# Patient Record
Sex: FEMALE | Race: WHITE | NOT HISPANIC OR LATINO | Employment: FULL TIME
[De-identification: names, ages, dates, MRNs, and addresses within clinical notes are randomized per-mention and may not be internally consistent; named-entity substitution may affect disease eponyms.]

---

## 2017-06-10 ENCOUNTER — HEALTH MAINTENANCE LETTER (OUTPATIENT)
Age: 27
End: 2017-06-10

## 2019-09-30 ENCOUNTER — HEALTH MAINTENANCE LETTER (OUTPATIENT)
Age: 29
End: 2019-09-30

## 2021-01-15 ENCOUNTER — HEALTH MAINTENANCE LETTER (OUTPATIENT)
Age: 31
End: 2021-01-15

## 2021-10-24 ENCOUNTER — HEALTH MAINTENANCE LETTER (OUTPATIENT)
Age: 31
End: 2021-10-24

## 2022-02-13 ENCOUNTER — HEALTH MAINTENANCE LETTER (OUTPATIENT)
Age: 32
End: 2022-02-13

## 2022-10-15 ENCOUNTER — HEALTH MAINTENANCE LETTER (OUTPATIENT)
Age: 32
End: 2022-10-15

## 2023-03-11 ENCOUNTER — HOSPITAL ENCOUNTER (EMERGENCY)
Facility: CLINIC | Age: 33
Discharge: HOME | End: 2023-03-11
Attending: EMERGENCY MEDICINE | Admitting: EMERGENCY MEDICINE

## 2023-03-11 ENCOUNTER — APPOINTMENT (OUTPATIENT)
Dept: ULTRASOUND IMAGING | Facility: CLINIC | Age: 33
End: 2023-03-11
Attending: EMERGENCY MEDICINE

## 2023-03-11 VITALS
TEMPERATURE: 97.5 F | HEART RATE: 99 BPM | OXYGEN SATURATION: 99 % | DIASTOLIC BLOOD PRESSURE: 88 MMHG | RESPIRATION RATE: 20 BRPM | HEIGHT: 67 IN | WEIGHT: 200 LBS | SYSTOLIC BLOOD PRESSURE: 133 MMHG | BODY MASS INDEX: 31.39 KG/M2

## 2023-03-11 DIAGNOSIS — O20.0 THREATENED ABORTION: ICD-10-CM

## 2023-03-11 LAB
ABO/RH(D): NORMAL
ABO/RH(D): NORMAL
ANION GAP SERPL CALCULATED.3IONS-SCNC: 8 MMOL/L (ref 7–15)
ANTIBODY SCREEN: NEGATIVE
BASOPHILS # BLD AUTO: 0 10E3/UL (ref 0–0.2)
BASOPHILS NFR BLD AUTO: 0 %
BUN SERPL-MCNC: 10.2 MG/DL (ref 6–20)
CALCIUM SERPL-MCNC: 9.3 MG/DL (ref 8.6–10)
CHLORIDE SERPL-SCNC: 102 MMOL/L (ref 98–107)
CREAT SERPL-MCNC: 0.71 MG/DL (ref 0.51–0.95)
DEPRECATED HCO3 PLAS-SCNC: 25 MMOL/L (ref 22–29)
EOSINOPHIL # BLD AUTO: 0.1 10E3/UL (ref 0–0.7)
EOSINOPHIL NFR BLD AUTO: 1 %
ERYTHROCYTE [DISTWIDTH] IN BLOOD BY AUTOMATED COUNT: 11.8 % (ref 10–15)
FETAL BLEED SCREEN: NORMAL
GFR SERPL CREATININE-BSD FRML MDRD: >90 ML/MIN/1.73M2
GLUCOSE SERPL-MCNC: 113 MG/DL (ref 70–99)
HCG INTACT+B SERPL-ACNC: ABNORMAL MIU/ML
HCT VFR BLD AUTO: 38.7 % (ref 35–47)
HGB BLD-MCNC: 13.5 G/DL (ref 11.7–15.7)
IMM GRANULOCYTES # BLD: 0 10E3/UL
IMM GRANULOCYTES NFR BLD: 0 %
LYMPHOCYTES # BLD AUTO: 2 10E3/UL (ref 0.8–5.3)
LYMPHOCYTES NFR BLD AUTO: 22 %
MCH RBC QN AUTO: 31.8 PG (ref 26.5–33)
MCHC RBC AUTO-ENTMCNC: 34.9 G/DL (ref 31.5–36.5)
MCV RBC AUTO: 91 FL (ref 78–100)
MONOCYTES # BLD AUTO: 0.7 10E3/UL (ref 0–1.3)
MONOCYTES NFR BLD AUTO: 8 %
NEUTROPHILS # BLD AUTO: 6.3 10E3/UL (ref 1.6–8.3)
NEUTROPHILS NFR BLD AUTO: 69 %
NRBC # BLD AUTO: 0 10E3/UL
NRBC BLD AUTO-RTO: 0 /100
PLATELET # BLD AUTO: 242 10E3/UL (ref 150–450)
POTASSIUM SERPL-SCNC: 3.7 MMOL/L (ref 3.4–5.3)
RBC # BLD AUTO: 4.24 10E6/UL (ref 3.8–5.2)
SODIUM SERPL-SCNC: 135 MMOL/L (ref 136–145)
SPECIMEN EXPIRATION DATE: NORMAL
SPECIMEN EXPIRATION DATE: NORMAL
WBC # BLD AUTO: 9.2 10E3/UL (ref 4–11)

## 2023-03-11 PROCEDURE — 250N000011 HC RX IP 250 OP 636: Performed by: EMERGENCY MEDICINE

## 2023-03-11 PROCEDURE — 85461 HEMOGLOBIN FETAL: CPT | Performed by: EMERGENCY MEDICINE

## 2023-03-11 PROCEDURE — 76801 OB US < 14 WKS SINGLE FETUS: CPT

## 2023-03-11 PROCEDURE — 80048 BASIC METABOLIC PNL TOTAL CA: CPT | Performed by: EMERGENCY MEDICINE

## 2023-03-11 PROCEDURE — 86901 BLOOD TYPING SEROLOGIC RH(D): CPT | Performed by: EMERGENCY MEDICINE

## 2023-03-11 PROCEDURE — 84702 CHORIONIC GONADOTROPIN TEST: CPT | Performed by: EMERGENCY MEDICINE

## 2023-03-11 PROCEDURE — 36415 COLL VENOUS BLD VENIPUNCTURE: CPT | Performed by: EMERGENCY MEDICINE

## 2023-03-11 PROCEDURE — 85025 COMPLETE CBC W/AUTO DIFF WBC: CPT | Performed by: EMERGENCY MEDICINE

## 2023-03-11 PROCEDURE — 99285 EMERGENCY DEPT VISIT HI MDM: CPT | Mod: 25

## 2023-03-11 PROCEDURE — 86850 RBC ANTIBODY SCREEN: CPT | Performed by: EMERGENCY MEDICINE

## 2023-03-11 RX ADMIN — HUMAN RHO(D) IMMUNE GLOBULIN 300 MCG: 1500 SOLUTION INTRAMUSCULAR; INTRAVENOUS at 15:33

## 2023-03-11 ASSESSMENT — ACTIVITIES OF DAILY LIVING (ADL)
ADLS_ACUITY_SCORE: 35
ADLS_ACUITY_SCORE: 33

## 2023-03-11 ASSESSMENT — ENCOUNTER SYMPTOMS: ABDOMINAL PAIN: 1

## 2023-03-11 NOTE — ED PROVIDER NOTES
"  History     Chief Complaint:  Vaginal Bleeding (2nd pregnancy, bleeding started a week ago, cramping as well. Approx 8 weeks. )       The history is provided by the patient.      Randee Townsend is a 32 year old pregnant female, , 8w0d, with a history of dysmenorrhea, anemia, breast cyst, among others, who presents with vaginal bleeding and cramping for the last 2 weeks. She notes waking up with lots of abdominal pain. Randee mentions that she had an  during her first pregnancy.     Independent Historian:   None - Patient Only    Review of External Notes: None.     ROS:  Review of Systems   Gastrointestinal: Positive for abdominal pain (cramping).   Genitourinary: Positive for vaginal bleeding.   All other systems reviewed and are negative.      Allergies:  The patient has no known active allergies.     Medications:    Xanax  Naprosyn  Zoloft  Desyrel    Past Medical History:    Depression   Dysmenorrhea  Acute pain of left knee  ADD  Papilloma of breast, right  Tobacco abuse  Anxiety   Anemia  Vitamin D deficiency  Breast cyst, right    Past Surgical History:    [place] teeth extraction     Family History:    Father: anxiety  Sister(s): anxiety, cervical cancer  Mother: high blood pressure     Social History:  The patient presents to the ED via car.  PCP: [facility]ama[facility]     Physical Exam     Patient Vitals for the past 24 hrs:   BP Temp Temp src Pulse Resp SpO2 Height Weight   23 1226 133/88 97.5  F (36.4  C) Temporal 99 20 99 % 1.702 m (5' 7\") 90.7 kg (200 lb)        Physical Exam    Constitutional:whitefemale supine no respiratory distress  HENT: No signs of trauma.   Eyes: EOM are normal. Pupils are equal, round, and reactive to light.   Neck: Normal range of motion. No JVD present. No cervical adenopathy.  Cardiovascular: Regular rhythm.  Exam reveals no gallop and no friction rub.    No murmur heard.  Pulmonary/Chest: Bilateral breath sounds normal. No wheezes, rhonchi or " rales.  Abdominal: Soft. No tenderness. No rebound or guarding.   Musculoskeletal: No edema. No tenderness.   Lymphadenopathy: No lymphadenopathy.   Neurological: Alert and oriented to person, place, and time. Normal strength. Coordination normal.   Skin: Skin is warm and dry. No rash noted. No erythema.   Pelvic: Vulva: negative. Vagina: no bleeding. Cervix: closed. No blood. No motion tenderness Uterus and adnexa: no masses appreciated. no tenderness.    Emergency Department Course     Imaging:  US OB 1st Trimester W Transvaginal W Doppler   Final Result   IMPRESSION:    1.  Single living intrauterine gestation at 6 weeks 3 days, EDC 11/01/2023.   2.  Trace amount of subchorionic blood products inferior to the gestational sac 17 x 7 x 6 mm.   3.  Probable 3 cm intramural fibroid posterior uterine body.            Report per radiology    Laboratory:  Labs Ordered and Resulted from Time of ED Arrival to Time of ED Departure   BASIC METABOLIC PANEL - Abnormal       Result Value    Sodium 135 (*)     Potassium 3.7      Chloride 102      Carbon Dioxide (CO2) 25      Anion Gap 8      Urea Nitrogen 10.2      Creatinine 0.71      Calcium 9.3      Glucose 113 (*)     GFR Estimate >90     HCG QUANTITATIVE PREGNANCY - Abnormal    hCG Quantitative 31,527 (*)    CBC WITH PLATELETS AND DIFFERENTIAL    WBC Count 9.2      RBC Count 4.24      Hemoglobin 13.5      Hematocrit 38.7      MCV 91      MCH 31.8      MCHC 34.9      RDW 11.8      Platelet Count 242      % Neutrophils 69      % Lymphocytes 22      % Monocytes 8      % Eosinophils 1      % Basophils 0      % Immature Granulocytes 0      NRBCs per 100 WBC 0      Absolute Neutrophils 6.3      Absolute Lymphocytes 2.0      Absolute Monocytes 0.7      Absolute Eosinophils 0.1      Absolute Basophils 0.0      Absolute Immature Granulocytes 0.0      Absolute NRBCs 0.0     TYPE AND SCREEN, ADULT    ABO/RH(D) O NEG      Antibody Screen Negative      SPECIMEN EXPIRATION DATE  [id number]     RH IMMUNE GLOBULIN SCREEN    ABO/RH(D) O NEG      Fetal Bleed Screen NEG      SPECIMEN EXPIRATION DATE [id number]     ABO/RH TYPE AND SCREEN      Emergency Department Course & Assessments:       Interventions:  Medications   rho(D) immune globulin (RHOPHYLAC) injection 300 mcg (300 mcg Intravenous $Given 3/11/23 1533)      Assessments:  1334 I obtained history and examined the patient as noted above.  1358 I rechecked the patient and performed a pelvic exam.  1514 I rechecked the patient and explained findings.    Independent Interpretation (X-rays, CTs, rhythm strip):  None    Social Determinants of Health affecting care:   None    Disposition:  The patient was discharged to home.     Impression & Plan      Medical Decision Making:  This is a 32 year old who feels that she is about 8 weeks pregnant that had some vaginal bleeding this week and some cramping. She has yet to establish care. She is [id number]. On exam, her abdomen is soft and nontender. Pelvic exam is unremarkable. Ultrasound shows a 6 week 3 day intrauterine pregnancy with a very small subchorionic hemorrhage. She is O- so she got a dose of Rhogam IV. Patient is referred to [facility] for on call OBGYN. I told her she gets heavy bleeding, more than a pad every 30 minutes, pain, or increasing weakness then she should recheck in the ED otherwise, follow up. Impression: threatened AB.    Diagnosis:    ICD-10-CM    1. Threatened   O20.0            Scribe Disclosure:  I, Daniel Schultz, am serving as a scribe at 6:52 PM on 3/11/2023 to document services personally performed by Hao Romero MD, based on my observations and the provider's statements to me.   3/11/2023   Hao Romero MD Steinman, Randall Ira, MD  23 0726

## 2023-03-11 NOTE — ED TRIAGE NOTES
Vaginal bleeding started a week ago, cramping as well. Approx 8 weeks. States 2nd pregnancy. No US yet.      Triage Assessment     Row Name 03/11/23 2255       Triage Assessment (Adult)    Airway WDL WDL       Respiratory WDL    Respiratory WDL WDL       Skin Circulation/Temperature WDL    Skin Circulation/Temperature WDL WDL       Cardiac WDL    Cardiac WDL WDL       Peripheral/Neurovascular WDL    Peripheral Neurovascular WDL WDL       Cognitive/Neuro/Behavioral WDL    Cognitive/Neuro/Behavioral WDL WDL

## 2023-03-26 ENCOUNTER — HEALTH MAINTENANCE LETTER (OUTPATIENT)
Age: 33
End: 2023-03-26

## 2024-05-26 ENCOUNTER — HEALTH MAINTENANCE LETTER (OUTPATIENT)
Age: 34
End: 2024-05-26